# Patient Record
Sex: MALE | Race: WHITE | NOT HISPANIC OR LATINO | URBAN - METROPOLITAN AREA
[De-identification: names, ages, dates, MRNs, and addresses within clinical notes are randomized per-mention and may not be internally consistent; named-entity substitution may affect disease eponyms.]

---
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
## 2022-06-27 NOTE — ED ADULT NURSE NOTE - PT NEEDS ASSIST
Final Anesthesia Post-op Assessment    Patient: Valerio Wilhelm  Procedure(s) Performed: ABLATION ATRIAL FIB - CV; DRUG STUDY/CHALLENGE; ICE; MAPPING INTRACARDIAC 3-D; CLOSURE DEVICE; ULTRASOUND ACCESS; CARDIOVERSION  Anesthesia type: General    Vitals Value Taken Time   Temp 36.1 Â°C (97 Â°F) 06/27/22 1140   Pulse 64 06/27/22 1350   Resp 14 06/27/22 1350   SpO2 97 % 06/27/22 1350   /90 06/27/22 1350         Patient Location: Phase II  Post-op Vital Signs:stable  Level of Consciousness: participates in exam, oriented, awake, alert and return to baseline  Respiratory Status: spontaneous ventilation  Cardiovascular blood pressure returned to baseline  Hydration: euvolemic  Pain Management: well controlled  Vomiting: none  Nausea: None  Airway Patency:patent  Post-op Assessment: awake, alert, appropriately conversant, or baseline, no complications, patient tolerated procedure well with no complications, no evidence of recall, dentition within defined limits, moving all extremities and No Corneal Abrasion      There were no known complications for this encounter.
no

## 2023-05-16 NOTE — ED ADULT TRIAGE NOTE - HEIGHT IN INCHES
05/16/23      Margarito Paul  [address] [place] [address]      To whom it may concern, Margarito Paul is under my care and has been a patient of mine since 11/2020.  He is active with chronic disease management with me since then.    Sincerely,         Suzanne Alanis MD, Internal Medicine  [facility], [facility]  [address] [facility] [address]  Phone: [phone]  Fax: [phone]             9